# Patient Record
Sex: FEMALE | Race: WHITE | NOT HISPANIC OR LATINO | URBAN - METROPOLITAN AREA
[De-identification: names, ages, dates, MRNs, and addresses within clinical notes are randomized per-mention and may not be internally consistent; named-entity substitution may affect disease eponyms.]

---

## 2020-05-25 ENCOUNTER — EMERGENCY (EMERGENCY)
Facility: HOSPITAL | Age: 40
LOS: 0 days | Discharge: HOME | End: 2020-05-25
Attending: EMERGENCY MEDICINE
Payer: COMMERCIAL

## 2020-05-25 VITALS — WEIGHT: 160.06 LBS | HEIGHT: 64 IN

## 2020-05-25 VITALS
OXYGEN SATURATION: 100 % | DIASTOLIC BLOOD PRESSURE: 65 MMHG | SYSTOLIC BLOOD PRESSURE: 117 MMHG | HEART RATE: 94 BPM | RESPIRATION RATE: 18 BRPM | TEMPERATURE: 99 F
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
## 2020-05-25 DIAGNOSIS — S66.326A LACERATION OF EXTENSOR MUSCLE, FASCIA AND TENDON OF RIGHT LITTLE FINGER AT WRIST AND HAND LEVEL, INITIAL ENCOUNTER: ICD-10-CM
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
## 2020-05-25 PROCEDURE — 29130 APPL FINGER SPLINT STATIC: CPT | Mod: 25

## 2020-05-25 PROCEDURE — 99283 EMERGENCY DEPT VISIT LOW MDM: CPT | Mod: 25

## 2020-05-25 PROCEDURE — 29130 APPL FINGER SPLINT STATIC: CPT | Mod: F9

## 2020-05-25 PROCEDURE — 99283 EMERGENCY DEPT VISIT LOW MDM: CPT

## 2020-05-25 PROCEDURE — 26418 REPAIR FINGER TENDON: CPT | Mod: 54

## 2020-05-25 PROCEDURE — 26418 REPAIR FINGER TENDON: CPT | Mod: F9

## 2020-05-25 RX ORDER — CEPHALEXIN 500 MG
1 CAPSULE ORAL
Qty: 15 | Refills: 0
Start: 2020-05-25 | End: 2020-05-29

## 2020-05-25 NOTE — ED PROVIDER NOTE - PATIENT PORTAL LINK FT
You can access the FollowMyHealth Patient Portal offered by [facility] by registering at the following website: [email or web address]. By joining [employer]’s FollowMyHealth portal, you will also be able to view your health information using other applications (apps) compatible with our system.

## 2020-05-25 NOTE — ED PROCEDURE NOTE - CPROC ED POST PROC CARE GUIDE1
Instructed patient/caregiver to follow-up with primary care physician./Verbal/written post procedure instructions were given to patient/caregiver./Instructed patient/caregiver regarding signs and symptoms of infection./Elevate the injured extremity as instructed./Keep the cast/splint/dressing clean and dry.
Keep the cast/splint/dressing clean and dry./Instructed patient/caregiver to follow-up with primary care physician./Instructed patient/caregiver regarding signs and symptoms of infection./Verbal/written post procedure instructions were given to patient/caregiver.

## 2020-05-25 NOTE — ED PROVIDER NOTE - PHYSICAL EXAMINATION
Constitutional: NAD AAOx3  Cardiac: s1s2. rrr  Lungs: CTA B/L. No accessory muscle use  Abd: soft, nd. NTTP. no r/g/r  Ext: 3cm laceration to ulnar aspect of proximal R 5th digit to PIP with partial laceration of extensor tendon. FDP + FDS intact. FROM 5/5 muscle strength

## 2020-05-25 NOTE — ED PROVIDER NOTE - PROGRESS NOTE DETAILS
Wound cleansed with copious NS, repaired partial extensor tendon laceration with 2 monocryl sutures, remainder of laceration repaired with nylon, finger splint, applied, wound care instructions discussed with pt. Will Fu with ortho hand for injury complication. -Devendra Goldberg PA-C pt seen and examined.  Pw laceration from broken glass to R medial/dorsal 5th digit over PIP with small lac in extensor tendon.  no active bleeding.  FROM of digit.  N/v intact.  laceration repair.  strict return and follow up guidance given.  MD Coleman

## 2020-05-25 NOTE — ED PROVIDER NOTE - OBJECTIVE STATEMENT
39 y/o f presents with laceration to R 5th digit. pt was washing dishing, she dropped a glass and cut her R 5th digit. Denies motor or sensory deficit. No other complaints at this time. -Devendra Goldberg PA-C

## 2020-05-25 NOTE — ED PROVIDER NOTE - ATTENDING CONTRIBUTION TO CARE
I, Corrine Faustin MD,  performed the initial face to face bedside interview with this patient regarding history of present illness, review of symptoms and relevant past medical, social and family history.  I completed an independent physical examination.  I was the initial provider who evaluated this patient. I have signed out the follow up of any pending tests (i.e. labs, radiological studies) to the ACP.  I have communicated the patient’s plan of care and disposition with the ACP.

## 2020-05-25 NOTE — ED ADULT NURSE NOTE - NSIMPLEMENTINTERV_GEN_ALL_ED
Implemented All Universal Safety Interventions:  [place] to call system. Call bell, personal items and telephone within reach. Instruct patient to call for assistance. Room bathroom lighting operational. Non-slip footwear when patient is off stretcher. Physically safe environment: no spills, clutter or unnecessary equipment. Stretcher in lowest position, wheels locked, appropriate side rails in place.

## 2020-05-25 NOTE — ED PROVIDER NOTE - CARE PROVIDER_API CALL
Dc Ojeda  ORTHOPAEDIC SURGERY  [address] [place] [address]  Phone: [phone]  Fax: [phone]  Follow Up Time: 7-10 Days

## 2020-05-25 NOTE — ED PROVIDER NOTE - NSFOLLOWUPINSTRUCTIONS_ED_ALL_ED_FT
ice to area x 24 hours; dressing to remain in place x 24 hours; then wound is to be cleaned with soap and water, dried well and rebandaged, after 48 hours patient can get wound wet in the shower, but sutures can never be drenched, they need to be completely dried after getting them wet; keep wound dry and clean, during daily activities try and be mindful that sutures are in place so that you do not reopen wound, wound should be checked in 2 days by your primary care doctor or return to the clinic and suture removal should be done in 7-10 days by your regular doctor or you may return to ED for suture removal; if any significant redness, swelling, purulent drainage, or any severe increase in pain or if fever occurs, please return to ED immediately. if there are any other changes or worsening of symptoms patient is to return to ED

## 2021-07-17 ENCOUNTER — EMERGENCY (EMERGENCY)
Facility: HOSPITAL | Age: 41
LOS: 0 days | Discharge: HOME | End: 2021-07-17
Attending: EMERGENCY MEDICINE
Payer: COMMERCIAL

## 2021-07-17 VITALS
DIASTOLIC BLOOD PRESSURE: 64 MMHG | HEART RATE: 89 BPM | TEMPERATURE: 99 F | SYSTOLIC BLOOD PRESSURE: 112 MMHG | OXYGEN SATURATION: 100 % | RESPIRATION RATE: 18 BRPM

## 2021-07-17 VITALS — WEIGHT: 160.06 LBS | HEIGHT: 64 IN

## 2021-07-17 DIAGNOSIS — R07.9 CHEST PAIN, UNSPECIFIED: ICD-10-CM

## 2021-07-17 DIAGNOSIS — R79.9 ABNORMAL FINDING OF BLOOD CHEMISTRY, UNSPECIFIED: ICD-10-CM

## 2021-07-17 DIAGNOSIS — R07.89 OTHER CHEST PAIN: ICD-10-CM

## 2021-07-17 DIAGNOSIS — C85.28 MEDIASTINAL (THYMIC) LARGE B-CELL LYMPHOMA, LYMPH NODES OF MULTIPLE SITES: ICD-10-CM

## 2021-07-17 LAB
ALBUMIN SERPL ELPH-MCNC: 3.7 G/DL — SIGNIFICANT CHANGE UP (ref 3.3–5)
ALP SERPL-CCNC: 56 U/L — SIGNIFICANT CHANGE UP (ref 40–120)
ALT FLD-CCNC: 28 U/L — SIGNIFICANT CHANGE UP (ref 12–78)
ANION GAP SERPL CALC-SCNC: 4 MMOL/L — LOW (ref 5–17)
APPEARANCE UR: CLEAR — SIGNIFICANT CHANGE UP
APTT BLD: 28.3 SEC — SIGNIFICANT CHANGE UP (ref 27.5–35.5)
AST SERPL-CCNC: 14 U/L — LOW (ref 15–37)
BASOPHILS # BLD AUTO: 0.04 K/UL — SIGNIFICANT CHANGE UP (ref 0–0.2)
BASOPHILS NFR BLD AUTO: 0.9 % — SIGNIFICANT CHANGE UP (ref 0–2)
BILIRUB SERPL-MCNC: 0.5 MG/DL — SIGNIFICANT CHANGE UP (ref 0.2–1.2)
BILIRUB UR-MCNC: NEGATIVE — SIGNIFICANT CHANGE UP
BUN SERPL-MCNC: 8 MG/DL — SIGNIFICANT CHANGE UP (ref 7–23)
CALCIUM SERPL-MCNC: 9.4 MG/DL — SIGNIFICANT CHANGE UP (ref 8.5–10.1)
CHLORIDE SERPL-SCNC: 108 MMOL/L — SIGNIFICANT CHANGE UP (ref 96–108)
CO2 SERPL-SCNC: 29 MMOL/L — SIGNIFICANT CHANGE UP (ref 22–31)
COLOR SPEC: YELLOW — SIGNIFICANT CHANGE UP
CREAT SERPL-MCNC: 0.66 MG/DL — SIGNIFICANT CHANGE UP (ref 0.5–1.3)
DIFF PNL FLD: ABNORMAL
EOSINOPHIL # BLD AUTO: 0.09 K/UL — SIGNIFICANT CHANGE UP (ref 0–0.5)
EOSINOPHIL NFR BLD AUTO: 2 % — SIGNIFICANT CHANGE UP (ref 0–6)
GLUCOSE SERPL-MCNC: 90 MG/DL — SIGNIFICANT CHANGE UP (ref 70–99)
GLUCOSE UR QL: NEGATIVE MG/DL — SIGNIFICANT CHANGE UP
HCT VFR BLD CALC: 40.7 % — SIGNIFICANT CHANGE UP (ref 34.5–45)
HGB BLD-MCNC: 13.8 G/DL — SIGNIFICANT CHANGE UP (ref 11.5–15.5)
IMM GRANULOCYTES NFR BLD AUTO: 0.2 % — SIGNIFICANT CHANGE UP (ref 0–1.5)
INR BLD: 0.98 RATIO — SIGNIFICANT CHANGE UP (ref 0.88–1.16)
KETONES UR-MCNC: NEGATIVE — SIGNIFICANT CHANGE UP
LEUKOCYTE ESTERASE UR-ACNC: NEGATIVE — SIGNIFICANT CHANGE UP
LYMPHOCYTES # BLD AUTO: 1.33 K/UL — SIGNIFICANT CHANGE UP (ref 1–3.3)
LYMPHOCYTES # BLD AUTO: 29.2 % — SIGNIFICANT CHANGE UP (ref 13–44)
MCHC RBC-ENTMCNC: 30.5 PG — SIGNIFICANT CHANGE UP (ref 27–34)
MCHC RBC-ENTMCNC: 33.9 GM/DL — SIGNIFICANT CHANGE UP (ref 32–36)
MCV RBC AUTO: 90 FL — SIGNIFICANT CHANGE UP (ref 80–100)
MONOCYTES # BLD AUTO: 0.36 K/UL — SIGNIFICANT CHANGE UP (ref 0–0.9)
MONOCYTES NFR BLD AUTO: 7.9 % — SIGNIFICANT CHANGE UP (ref 2–14)
NEUTROPHILS # BLD AUTO: 2.72 K/UL — SIGNIFICANT CHANGE UP (ref 1.8–7.4)
NEUTROPHILS NFR BLD AUTO: 59.8 % — SIGNIFICANT CHANGE UP (ref 43–77)
NITRITE UR-MCNC: NEGATIVE — SIGNIFICANT CHANGE UP
PH UR: 8 — SIGNIFICANT CHANGE UP (ref 5–8)
PLATELET # BLD AUTO: 182 K/UL — SIGNIFICANT CHANGE UP (ref 150–400)
POTASSIUM SERPL-MCNC: 4 MMOL/L — SIGNIFICANT CHANGE UP (ref 3.5–5.3)
POTASSIUM SERPL-SCNC: 4 MMOL/L — SIGNIFICANT CHANGE UP (ref 3.5–5.3)
PROT SERPL-MCNC: 6.8 GM/DL — SIGNIFICANT CHANGE UP (ref 6–8.3)
PROT UR-MCNC: NEGATIVE MG/DL — SIGNIFICANT CHANGE UP
PROTHROM AB SERPL-ACNC: 11.5 SEC — SIGNIFICANT CHANGE UP (ref 10.6–13.6)
RBC # BLD: 4.52 M/UL — SIGNIFICANT CHANGE UP (ref 3.8–5.2)
RBC # FLD: 11.6 % — SIGNIFICANT CHANGE UP (ref 10.3–14.5)
SODIUM SERPL-SCNC: 141 MMOL/L — SIGNIFICANT CHANGE UP (ref 135–145)
SP GR SPEC: 1.01 — SIGNIFICANT CHANGE UP (ref 1.01–1.02)
TROPONIN I SERPL-MCNC: <0.015 NG/ML — SIGNIFICANT CHANGE UP (ref 0.01–0.04)
UROBILINOGEN FLD QL: NEGATIVE MG/DL — SIGNIFICANT CHANGE UP
WBC # BLD: 4.55 K/UL — SIGNIFICANT CHANGE UP (ref 3.8–10.5)
WBC # FLD AUTO: 4.55 K/UL — SIGNIFICANT CHANGE UP (ref 3.8–10.5)

## 2021-07-17 PROCEDURE — 71275 CT ANGIOGRAPHY CHEST: CPT

## 2021-07-17 PROCEDURE — 36415 COLL VENOUS BLD VENIPUNCTURE: CPT

## 2021-07-17 PROCEDURE — 71275 CT ANGIOGRAPHY CHEST: CPT | Mod: 26,ME

## 2021-07-17 PROCEDURE — 86901 BLOOD TYPING SEROLOGIC RH(D): CPT

## 2021-07-17 PROCEDURE — 85610 PROTHROMBIN TIME: CPT

## 2021-07-17 PROCEDURE — 81025 URINE PREGNANCY TEST: CPT

## 2021-07-17 PROCEDURE — 99284 EMERGENCY DEPT VISIT MOD MDM: CPT | Mod: 25

## 2021-07-17 PROCEDURE — 93005 ELECTROCARDIOGRAM TRACING: CPT

## 2021-07-17 PROCEDURE — 84484 ASSAY OF TROPONIN QUANT: CPT

## 2021-07-17 PROCEDURE — G1004: CPT

## 2021-07-17 PROCEDURE — 80053 COMPREHEN METABOLIC PANEL: CPT

## 2021-07-17 PROCEDURE — 99285 EMERGENCY DEPT VISIT HI MDM: CPT

## 2021-07-17 PROCEDURE — 85025 COMPLETE CBC W/AUTO DIFF WBC: CPT

## 2021-07-17 PROCEDURE — 86900 BLOOD TYPING SEROLOGIC ABO: CPT

## 2021-07-17 PROCEDURE — 93010 ELECTROCARDIOGRAM REPORT: CPT

## 2021-07-17 PROCEDURE — 81001 URINALYSIS AUTO W/SCOPE: CPT

## 2021-07-17 PROCEDURE — 86850 RBC ANTIBODY SCREEN: CPT

## 2021-07-17 PROCEDURE — 85730 THROMBOPLASTIN TIME PARTIAL: CPT

## 2021-07-17 RX ORDER — SODIUM CHLORIDE 9 MG/ML
1000 INJECTION INTRAMUSCULAR; INTRAVENOUS; SUBCUTANEOUS ONCE
Refills: 0 | Status: COMPLETED | OUTPATIENT
Start: 2021-07-17 | End: 2021-07-17

## 2021-07-17 RX ADMIN — SODIUM CHLORIDE 1000 MILLILITER(S): 9 INJECTION INTRAMUSCULAR; INTRAVENOUS; SUBCUTANEOUS at 11:25

## 2021-07-17 NOTE — ED STATDOCS - CONSTITUTIONAL, MLM
well appearing and in no apparent distress. normal... well appearing and in no apparent distress. Nontoxic appearing. AAOx4.

## 2021-07-17 NOTE — ED STATDOCS - MUSCULOSKELETAL, MLM
range of motion is not limited and there is no muscle tenderness. range of motion is not limited and there is no muscle tenderness. 5/5 strength on flexion and extension of all limbs. No nuchal rigidity. No saddle anesthesia.

## 2021-07-17 NOTE — ED STATDOCS - OBJECTIVE STATEMENT
42 y/o female with no pertinent PMHx, PSHx of c section, presents to the ED c/o abnormal lab results. Pt states she has been having chest pain radiating to L axilla for 2 weeks and f/u with MD yesterday and had outpatient labs done. Pt was called this AM and told she has elevated d dimer and was told to go to the ED to r/o blood clot. Pt has a cardio appointment in 2 days with Dr. Collazo. Denies peripheral edema. No recent travel. No recent surgery. No hx of MI or cardiac stents. No FHx of blood clots. No IV drug use. Non smoker. PMD: Dr. Mary Lou Baker. 42 y/o female with no pertinent PMHx, PSHx of c section, presents to the ED c/o abnormal lab results. Pt states she has been having chest pain radiating to L axilla for 2 weeks and f/u with MD yesterday and had outpatient labs done. Pt was called this AM and told she has elevated d dimer and was told to go to the ED to r/o blood clot. Pt has a cardio appointment in 2 days with Dr. Collazo. Denies peripheral edema. No palpitatio. No SOB. No melena or hematochezia. No recent travel. No recent surgery. No hx of MI or cardiac stents. No FHx of blood clots. No IV drug use. Non smoker.  No visual or focal neurological complaints. PMD: Dr. Mary Lou Renner.

## 2021-07-17 NOTE — ED ADULT NURSE NOTE - OBJECTIVE STATEMENT
pt arrives to ED complaining of chest pain. pt states she has had chest pain for "a few weeks." pt saw her PCP yesterday and had blood work drawn. this morning pt received call telling her to go to hospital because her d dimer is elevated. denies other medical history. alert and oriented x 4.

## 2021-07-17 NOTE — ED STATDOCS - NEUROLOGICAL, MLM
sensation is normal and strength is normal. NIH 0. GCS 15 CN 2-12 intact. 5/5 strength in upper and lower extremities

## 2021-07-17 NOTE — ED ADULT TRIAGE NOTE - CHIEF COMPLAINT QUOTE
Pt comes to the ED for elevated D-Dimer. Pt states that she has been having chest discomfort for a couple of weeks and that she was seen by MD yesterday and had blood work done. Pt was called this am and told to come to the ED for further evaluation secondary to elevated d-dimer.

## 2021-07-17 NOTE — ED STATDOCS - CARE PLAN
Principal Discharge DX:	Chest pain  Secondary Diagnosis:	Mediastinal lymphadenopathy   Principal Discharge DX:	Chest pain  Goal:	2 weeks of chest pain, trop negative, CTA negative for PE however incidental findings discussed with patient, copies of the labs and copy of the CT scan printed and provided with patient with patient following up with PMD and strict return precautions.  Secondary Diagnosis:	Mediastinal lymphadenopathy

## 2021-07-17 NOTE — ED STATDOCS - NSFOLLOWUPINSTRUCTIONS_ED_ALL_ED_FT
FOLLOW UP WITH YOUR DOCTORS AS SCHEDULED.  YOU WERE GIVEN A COPY OF YOUR RESULTS.  IT IS RECOMMENDED THAT THE CT OF YOUR CHEST BE REPEATED IN 3 MONTHS.  RETURN TO THE ER WITH ANY NEW OR WORSENING SYMPTOMS.      Lymphadenopathy    AMBULATORY CARE:    Lymphadenopathy is swelling of your lymph nodes. Lymph nodes are small organs that are part of your immune system. Lymph nodes are found throughout your body. They are most easily felt in your neck, under your arms, and near your groin. Lymphadenopathy can occur in one or more areas of your body.     Common signs and symptoms include the following: You may have no symptoms, or you may have any of the following:  •A painful, warm, or red lump under your skin      •More tired than usual      •Skin rash      •Unexplained weight loss      •Enlarged spleen (organ that filters blood)      •Fever or night sweats      Seek care immediately if:   •The swollen lymph nodes bleed.      •You have swollen lymph nodes in your neck that affect your breathing or swallowing.      Contact your healthcare provider if:   •You have a fever.      •You have a new swollen and painful lymph node.      •You have a skin rash.      •Your lymph node remains swollen or painful, or it gets bigger.      •Your lymph node has red streaks around it, or the skin around the lymph node is red.      •You have questions or concerns about your condition or care.      Follow up with your healthcare provider as directed: Write down your questions so you remember to ask them during your visits.     Self-care:   •Do not poke or squeeze the swollen lymph nodes.      •Apply heat to the swollen glands. You may use warm compresses, or an electric heating pad set on low.       •Rest as needed. If you have a fever, rest until your temperature returns to normal. Return to your normal daily activities slowly after your fever is gone.

## 2021-07-17 NOTE — ED STATDOCS - PROGRESS NOTE DETAILS
patient seen and evaluated initially at intake.  reporting 2 weeks of CP, elevated ddimer outpatient, sent in to r/o PE.  No PE, however there are some inflammed lymph nodes mediastinal.  Reviewed results with patient, need for repeat CT, supplied a copy and patient will follow up closely with her doctor.  strict return precautions reviewed -Chaparro Santos PA-C

## 2021-07-17 NOTE — ED STATDOCS - PATIENT PORTAL LINK FT
You can access the FollowMyHealth Patient Portal offered by [facility] by registering at the following website: [email or web address]. By joining [employer]’s FollowMyHealth portal, you will also be able to view your health information using other applications (apps) compatible with our system.

## 2021-07-17 NOTE — ED STATDOCS - PLAN OF CARE
2 weeks of chest pain, trop negative, CTA negative for PE however incidental findings discussed with patient, copies of the labs and copy of the CT scan printed and provided with patient with patient following up with PMD and strict return precautions.

## 2021-07-17 NOTE — ED STATDOCS - NS_ ATTENDINGSCRIBEDETAILS _ED_A_ED_FT
I Paul Meek MD saw and examined the patient. Scribe documented for me and under my supervision. I have modified the scribe's documentation where necessary to reflect my history, physical exam and other relevant documentations pertinent to the care of the patient.

## 2021-07-17 NOTE — ED STATDOCS - CARDIAC, MLM
normal rate, regular rhythm, and no murmur. normal rate, regular rhythm, and no gallops or rubs. 2+ pulses in bilateral dp and radial arteries. Cap refill less than 2 seconds.

## 2021-07-17 NOTE — ED STATDOCS - CLINICAL SUMMARY MEDICAL DECISION MAKING FREE TEXT BOX
pt with 2 weeks of parasternal pain with radiation to L axilla seen by Dr. Renner d dimer and other labs ordered, d dimer elevated suspicion for pe due to pain. no common risk factors. not morbidly obese. no recent surgery. no iv drug use. no fhx. will do CTA r/o pe, blood work, 1 set of troponin will every normal pt will see Dr. Collazo on Monday. pt with 2 weeks of parasternal pain with radiation to L axilla seen by Dr. Renner d dimer and other labs ordered, d dimer elevated suspicion for pe due to pain. no common risk factors. not morbidly obese. no recent surgery. no iv drug use. no fhx. will do CTA r/o pe, blood work, 1 set of troponin will every normal pt will see Dr. Collazo on Monday.    [facility]: CT negative for PE, incidental findings discussed with patient, hard copy provided, important to follow up with PMD and cardiology about incidental findings to ensure no other pathology causing them. Patient verbalizes understanding PA witness. Strict return precautions provided for patient. Patient to see Dr. Collazo on monday, low JANET score/cardiac risk factors, comfortable to go home with outpatient cardiology follow up.

## 2021-07-17 NOTE — ED STATDOCS - ATTENDING CONTRIBUTION TO CARE
I Paul Meek MD saw and examined the patient. MLP saw and examined the patient under my supervision. I discussed the care of the patient with MLP and agree with MLP's plan, assessment and care of the patient while in the ED.

## 2022-03-02 PROBLEM — Z78.9 OTHER SPECIFIED HEALTH STATUS: Chronic | Status: ACTIVE | Noted: 2021-07-29

## 2022-03-21 ENCOUNTER — APPOINTMENT (OUTPATIENT)
Dept: ORTHOPEDIC SURGERY | Facility: CLINIC | Age: 42
End: 2022-03-21
Payer: COMMERCIAL

## 2022-03-21 ENCOUNTER — NON-APPOINTMENT (OUTPATIENT)
Age: 42
End: 2022-03-21

## 2022-03-21 VITALS
HEIGHT: 64 IN | HEART RATE: 54 BPM | SYSTOLIC BLOOD PRESSURE: 93 MMHG | BODY MASS INDEX: 27.31 KG/M2 | DIASTOLIC BLOOD PRESSURE: 54 MMHG | WEIGHT: 160 LBS

## 2022-03-21 DIAGNOSIS — Z82.49 FAMILY HISTORY OF ISCHEMIC HEART DISEASE AND OTHER DISEASES OF THE CIRCULATORY SYSTEM: ICD-10-CM

## 2022-03-21 DIAGNOSIS — Z72.89 OTHER PROBLEMS RELATED TO LIFESTYLE: ICD-10-CM

## 2022-03-21 DIAGNOSIS — Z78.9 OTHER SPECIFIED HEALTH STATUS: ICD-10-CM

## 2022-03-21 PROCEDURE — 20610 DRAIN/INJ JOINT/BURSA W/O US: CPT | Mod: LT

## 2022-03-21 PROCEDURE — 73030 X-RAY EXAM OF SHOULDER: CPT | Mod: LT

## 2022-03-21 PROCEDURE — 99203 OFFICE O/P NEW LOW 30 MIN: CPT | Mod: 25

## 2022-03-21 RX ORDER — NORGESTIMATE AND ETHINYL ESTRADIOL 7DAYSX3 LO
KIT ORAL
Refills: 0 | Status: ACTIVE | COMMUNITY

## 2022-03-21 RX ORDER — ELETRIPTAN HYDROBROMIDE 40 MG/1
TABLET, FILM COATED ORAL
Refills: 0 | Status: ACTIVE | COMMUNITY

## 2022-03-22 NOTE — DISCUSSION/SUMMARY
[de-identified] : At this time I recommend ice and elevation for the impingement, left shoulder.  She will be reassessed in three or four weeks.

## 2022-03-22 NOTE — PROCEDURE
[de-identified] : Consent: \par At this time, I have recommended an injection to the left shoulder.  The risks and benefits of the procedure were discussed with the patient in detail.  Upon verbal consent of the patient, we proceeded with the injection as noted below.  \par \par Procedure:  \par After a sterile prep, the patient underwent an injection of 9 cc of 1% Lidocaine without epinephrine and 1 cc of Kenalog into the left shoulder, with immediate relief. The patient tolerated the procedure well.  There were no complications.

## 2022-03-22 NOTE — CONSULT LETTER
[Dear  ___] : Dear  [unfilled], [Consult Letter:] : I had the pleasure of evaluating your patient, [unfilled]. [Please see my note below.] : Please see my note below. [Consult Closing:] : Thank you very much for allowing me to participate in the care of this patient.  If you have any questions, please do not hesitate to contact me. [Sincerely,] : Sincerely, [FreeTextEntry3] : Jc Cha III, MD \par TIA/russel

## 2022-03-22 NOTE — ADDENDUM
[FreeTextEntry1] : This note was written by Kaley Thomas on 03/22/2022 acting as scribe for Jc Cha III, MD

## 2022-03-22 NOTE — PHYSICAL EXAM
[de-identified] : Right Shoulder: \par Shoulder: Range of Motion in Degrees:   	\par    	                        Claimant:          Normal:  	\par Abduction (Active)  	180  	180 degrees  	\par Abduction (Passive)  	180  	180 degrees  	\par Forward elevation (Active):  	180  	180 degrees  	\par Forward elevation (Passive):  180  	180 degrees  	\par External rotation (Active):  	45  	45 degrees  	\par External rotation (Passive):  	45  	45 degrees  	\par Internal rotation (Active):  	L-1  	L-1  	\par Internal rotation (Passive):  	L-1  	L-1  	\par \par No motor weakness to internal rotation, external rotation or abduction in the scapular plane.  Negative crank test.  Negative O’Logan’s test.  Negative Speed’s test. Negative Yergason’s test.  Negative cross arm test.  No tenderness to palpation at the AC joint. Negative Hawkin’s sign.  Negative Neer’s sign.  Negative apprehension. Negative sulcus sign.  No gross neurological or vascular deficits distally.  Skin is intact.  No rashes, scars or lesions.  2+ radial and ulnar pulses. No extra-articular swelling or tenderness. \par \par Left Shoulder:  \par Shoulder: Range of Motion in Degrees:	\par 	                                  Claimant:	Normal:	\par Abduction (Active)	                  180	               180 degrees	\par Abduction (Passive)	  180	               180 degrees	\par Forward elevation (Active):	  180	               180 degrees	\par Forward elevation (Passive):	  180	               180 degrees	\par External rotation (Active):	   45	               45 degrees	\par External rotation (Passive):	   45	               45 degrees	\par Internal rotation (Active):	   L-1	               L-1	\par Internal rotation (Passive):	   L-1	               L-1	\par  \par No motor weakness to internal rotation, external rotation or abduction in the scapular plane.  Negative crank test.  Negative O’Logan’s test.  Negative Speed’s test. Negative Yergason’s test.  Negative cross arm test.  No tenderness to palpation at the AC joint. Positive Hawkin’s sign.  Positive Neer’s sign. Negative apprehension. Negative sulcus sign.  No gross neurological or vascular deficits distally.  Skin is intact.  No rashes, scars or lesions. 2+ radial and ulnar pulses. No extra-articular swelling or tenderness.\par   [de-identified] : Gait: Normal    Station: Normal  [de-identified] : Appearance: Well-developed, well-nourished female  in no acute distress.  [de-identified] : Radiographs, two views of the left shoulder, show no obvious osseous abnormalities.

## 2022-04-13 ENCOUNTER — APPOINTMENT (OUTPATIENT)
Dept: ORTHOPEDIC SURGERY | Facility: CLINIC | Age: 42
End: 2022-04-13

## 2022-04-13 DIAGNOSIS — M75.42 IMPINGEMENT SYNDROME OF LEFT SHOULDER: ICD-10-CM

## 2022-04-13 PROCEDURE — 99441: CPT

## 2022-04-23 PROBLEM — M75.42 IMPINGEMENT SYNDROME OF LEFT SHOULDER: Status: ACTIVE | Noted: 2022-03-21

## 2022-04-25 ENCOUNTER — OUTPATIENT (OUTPATIENT)
Dept: OUTPATIENT SERVICES | Facility: HOSPITAL | Age: 42
LOS: 1 days | End: 2022-04-25
Payer: COMMERCIAL

## 2022-04-25 ENCOUNTER — APPOINTMENT (OUTPATIENT)
Dept: NEUROSURGERY | Facility: CLINIC | Age: 42
End: 2022-04-25
Payer: COMMERCIAL

## 2022-04-25 ENCOUNTER — APPOINTMENT (OUTPATIENT)
Dept: RADIOLOGY | Facility: CLINIC | Age: 42
End: 2022-04-25
Payer: COMMERCIAL

## 2022-04-25 VITALS
OXYGEN SATURATION: 99 % | TEMPERATURE: 97.7 F | BODY MASS INDEX: 26.29 KG/M2 | SYSTOLIC BLOOD PRESSURE: 111 MMHG | HEART RATE: 66 BPM | WEIGHT: 154 LBS | HEIGHT: 64 IN | DIASTOLIC BLOOD PRESSURE: 70 MMHG

## 2022-04-25 DIAGNOSIS — M54.2 CERVICALGIA: ICD-10-CM

## 2022-04-25 DIAGNOSIS — M54.9 DORSALGIA, UNSPECIFIED: ICD-10-CM

## 2022-04-25 PROCEDURE — 72070 X-RAY EXAM THORAC SPINE 2VWS: CPT

## 2022-04-25 PROCEDURE — 72070 X-RAY EXAM THORAC SPINE 2VWS: CPT | Mod: 26

## 2022-04-25 PROCEDURE — 99203 OFFICE O/P NEW LOW 30 MIN: CPT

## 2022-04-25 PROCEDURE — 72052 X-RAY EXAM NECK SPINE 6/>VWS: CPT | Mod: 26

## 2022-04-25 PROCEDURE — 72052 X-RAY EXAM NECK SPINE 6/>VWS: CPT

## 2022-04-25 RX ORDER — ATENOLOL 50 MG/1
50 TABLET ORAL
Qty: 30 | Refills: 0 | Status: ACTIVE | COMMUNITY
Start: 2021-12-10

## 2022-04-25 NOTE — CONSULT LETTER
[Dear  ___] : Dear  [unfilled], [Sincerely,] : Sincerely, [FreeTextEntry2] : Mary Lou Renner MD\par [address]\[place] [address] [FreeTextEntry1] : Haylie Deluca is a 42-year-old female who presents today with cervical and thoracic pain.  Patient reports symptoms started 6 months ago without any specific event.  She reports a ache and sharp pain to her cervical spine.  She reports occasional shooting pain radiating from her neck to the back of her head.  She denies any upper extremity numbness, tingling, or weakness.  She denies dropping of objects from her hand or dexterity difficulties.  Patient also endorses thoracic back pain.  She denies any anterior shooting pains.  She denies any lower extremity numbness, tingling, weakness or pain.  She denies any difficulties with walking or tripping over her feet.  She denies bowel or bladder dysfunction or saddle anesthesia.  Patient reports Advil provides her with good benefit.  Patient reports using heat and ice.  Stretching provides her with some relief.  Patient is alert and oriented.  No distress noted.  Negative Estela's.  Negative clonus.  Strength to bilateral upper and lower extremities 5/5.  Sensation to light touch upper and lower extremities equal and normal.  No pain elicited with palpation to the spine.  Diminished upper extremity reflexes bilaterally noted.  Unable to elicit right Achilles tendon reflex.  Remaining lower extremity reflexes present.  Patient is walking without difficulty.  I provided patient with a prescription for an x-ray of the thoracic and cervical spine.  I provided the patient with a referral for acupuncture and physical therapy.  We will follow-up in approximately 6 weeks to evaluate for progression.  Patient is aware to call with any further questions or concerns or with any new or worsening symptoms. [FreeTextEntry3] : Fatemeh Orr, MSN, FNP-BC\par Nurse Practitioner\par Neurosurgery\par [address] [place] [address], [address]nd floor \[place] [address] \par Office: [phone] \par Fax: [phone]\par \par

## 2022-06-08 ENCOUNTER — APPOINTMENT (OUTPATIENT)
Dept: NEUROSURGERY | Facility: CLINIC | Age: 42
End: 2022-06-08

## 2022-09-15 ENCOUNTER — APPOINTMENT (OUTPATIENT)
Dept: ORTHOPEDIC SURGERY | Facility: CLINIC | Age: 42
End: 2022-09-15

## 2022-09-15 PROCEDURE — 20610 DRAIN/INJ JOINT/BURSA W/O US: CPT | Mod: LT

## 2022-09-19 NOTE — ADDENDUM
[FreeTextEntry1] : This note was written by Anita Herzog on 09/19/2022 acting as a scribe for DENISE MCKEON III, MD

## 2022-09-19 NOTE — DISCUSSION/SUMMARY
[de-identified] : At this time, due to biceps/SLAP of left shoulder, I recommend ice and elevation. She will be reassessed in three to four weeks.

## 2022-09-19 NOTE — PROCEDURE
[de-identified] : Indication:  Biceps/SLAP Left Shoulder\par \par Consent: \par At this time, I have recommended an injection to the left shoulder.  The risks and benefits of the procedure were discussed with the patient in detail.  Upon verbal consent of the patient, we proceeded with the injection as noted below.  \par \par Procedure:  \par After a sterile prep, the patient underwent an injection of approximately 3mL of 1% Xylocaine 20 mg per 2 mL (10 mg per mL) without epinephrine and 2 mL of Kenalog (40mg/mL) into the left shoulder.  The patient tolerated the procedure well.  There were no complications.  \par \par : [employer]\par Drug name:     Xylocaine-MPF (Lidocaine HCI Injection, USP)\par NDC #:            55325-589-79\par Lot #:              [id number]\par Expiration:      05/26

## 2022-09-19 NOTE — HISTORY OF PRESENT ILLNESS
[de-identified] : The patient comes in today with increasing complaints of pain in her left shoulder.

## 2022-09-19 NOTE — PHYSICAL EXAM
[de-identified] : Left Shoulder: \par Range of Motion in Degrees:  	\par 	                        Claimant:	 Normal:	\par Abduction (Active)	                180	180 degrees	\par Abduction (Passive)	180	180 degrees	\par Forward elevation (Active):	180	180 degrees	\par Forward elevation (Passive):	180	180 degrees	\par External rotation (Active):	45	45 degrees	\par External rotation (Passive):	45	45 degrees	\par Internal rotation (Active):	L-1	L-1	\par Internal rotation (Passive):	L-1	L-1\par 	\par No motor weakness to internal rotation, external rotation or abduction in the scapular plane.  Positive crank test.  Positive O’Logan’s test.  Positive Speed’s test.  Positive Yergason’s test.  Negative cross arm test.  No tenderness to palpation at the AC joint. Negative Hawkin’s sign.  Negative Neer’s sign. Negative apprehension. Negative sulcus sign.  No gross neurological or vascular deficits distally.  Skin is intact.  No rashes, scars or lesions. 2+ radial and ulnar pulses. No extra-articular swelling or tenderness. \par  [de-identified] : Ambulating with a slightly antalgic to antalgic gait.  Station:  Normal.  [de-identified] : Appearance:  Well-developed, well-nourished female in no acute distress.\par

## 2022-10-03 ENCOUNTER — APPOINTMENT (OUTPATIENT)
Dept: ORTHOPEDIC SURGERY | Facility: CLINIC | Age: 42
End: 2022-10-03

## 2022-10-03 DIAGNOSIS — M75.22 BICIPITAL TENDINITIS, LEFT SHOULDER: ICD-10-CM

## 2022-10-03 DIAGNOSIS — S43.432A SUPERIOR GLENOID LABRUM LESION OF LEFT SHOULDER, INITIAL ENCOUNTER: ICD-10-CM

## 2022-10-03 PROCEDURE — 99441: CPT

## 2022-10-11 PROBLEM — S43.432A SUPERIOR GLENOID LABRUM LESION OF LEFT SHOULDER, INITIAL ENCOUNTER: Status: ACTIVE | Noted: 2022-09-15

## 2022-11-03 ENCOUNTER — APPOINTMENT (OUTPATIENT)
Dept: ORTHOPEDIC SURGERY | Facility: CLINIC | Age: 42
End: 2022-11-03

## 2023-01-28 ENCOUNTER — NON-APPOINTMENT (OUTPATIENT)
Age: 43
End: 2023-01-28

## 2024-11-13 ENCOUNTER — APPOINTMENT (OUTPATIENT)
Dept: ORTHOPEDIC SURGERY | Facility: CLINIC | Age: 44
End: 2024-11-13
Payer: COMMERCIAL

## 2024-11-13 VITALS — WEIGHT: 160 LBS | HEIGHT: 64 IN | BODY MASS INDEX: 27.31 KG/M2

## 2024-11-13 DIAGNOSIS — M22.41 CHONDROMALACIA PATELLAE, RIGHT KNEE: ICD-10-CM

## 2024-11-13 DIAGNOSIS — S83.206A UNSPECIFIED TEAR OF UNSPECIFIED MENISCUS, CURRENT INJURY, RIGHT KNEE, INITIAL ENCOUNTER: ICD-10-CM

## 2024-11-13 PROCEDURE — 73564 X-RAY EXAM KNEE 4 OR MORE: CPT | Mod: RT

## 2024-11-13 PROCEDURE — 99203 OFFICE O/P NEW LOW 30 MIN: CPT
